# Patient Record
Sex: MALE | Employment: OTHER | URBAN - METROPOLITAN AREA
[De-identification: names, ages, dates, MRNs, and addresses within clinical notes are randomized per-mention and may not be internally consistent; named-entity substitution may affect disease eponyms.]

---

## 2018-06-19 ENCOUNTER — CONSULTATION (OUTPATIENT)
Dept: URBAN - METROPOLITAN AREA CLINIC 11 | Facility: CLINIC | Age: 78
End: 2018-06-19

## 2018-06-19 ASSESSMENT — VISUAL ACUITY
OS_PH: 20/70-1
OD_SC: 20/400
OS_SC: 20/100

## 2018-06-19 ASSESSMENT — TONOMETRY
OS_IOP_MMHG: 24
OD_IOP_MMHG: 22

## 2018-06-19 NOTE — PATIENT DISCUSSION
Discussed benefits, alternatives, realistic expectations and risks of surgery including (but not limited to) change in refraction, infection, bleeding, retinal detachment, optic neuropathy, loss of vision, blindness, and loss of eye.

## 2018-06-19 NOTE — PATIENT DISCUSSION
About 50 percent of patients achieve good visual acuity of 20/40 or better. Only 10 percent of patients achieve nearly normal vision after surgery.